# Patient Record
Sex: MALE | Race: WHITE | Employment: OTHER | URBAN - NONMETROPOLITAN AREA
[De-identification: names, ages, dates, MRNs, and addresses within clinical notes are randomized per-mention and may not be internally consistent; named-entity substitution may affect disease eponyms.]

---

## 2020-01-17 ENCOUNTER — HOSPITAL ENCOUNTER (OUTPATIENT)
Dept: MRI IMAGING | Age: 65
Discharge: HOME | End: 2020-01-19
Payer: COMMERCIAL

## 2020-01-17 PROCEDURE — 72148 MRI LUMBAR SPINE W/O DYE: CPT

## 2020-05-21 ENCOUNTER — HOSPITAL ENCOUNTER (OUTPATIENT)
Age: 65
Setting detail: SPECIMEN
Discharge: HOME | End: 2020-05-21

## 2020-05-21 ENCOUNTER — OFFICE VISIT (OUTPATIENT)
Dept: UROLOGY | Age: 65
End: 2020-05-21
Payer: COMMERCIAL

## 2020-05-21 VITALS — SYSTOLIC BLOOD PRESSURE: 142 MMHG | DIASTOLIC BLOOD PRESSURE: 92 MMHG | WEIGHT: 233 LBS | BODY MASS INDEX: 34.41 KG/M2

## 2020-05-21 LAB
-: ABNORMAL
AMORPHOUS: ABNORMAL
BACTERIA: ABNORMAL
BILIRUBIN URINE: NEGATIVE
CASTS UA: ABNORMAL /LPF
COLOR: YELLOW
COMMENT UA: ABNORMAL
CRYSTALS, UA: ABNORMAL /HPF
EPITHELIAL CELLS UA: ABNORMAL /HPF (ref 0–5)
GLUCOSE URINE: NEGATIVE
KETONES, URINE: NEGATIVE
LEUKOCYTE ESTERASE, URINE: NEGATIVE
MUCUS: ABNORMAL
NITRITE, URINE: NEGATIVE
OTHER OBSERVATIONS UA: ABNORMAL
PH UA: 5.5 (ref 5–9)
PROTEIN UA: NEGATIVE
RBC UA: ABNORMAL /HPF (ref 0–2)
RENAL EPITHELIAL, UA: ABNORMAL /HPF
SPECIFIC GRAVITY UA: >1.03 (ref 1.01–1.02)
TRICHOMONAS: ABNORMAL
TURBIDITY: CLEAR
URINE HGB: NEGATIVE
UROBILINOGEN, URINE: NORMAL
WBC UA: ABNORMAL /HPF (ref 0–5)
YEAST: ABNORMAL

## 2020-05-21 PROCEDURE — 81001 URINALYSIS AUTO W/SCOPE: CPT

## 2020-05-21 PROCEDURE — 99204 OFFICE O/P NEW MOD 45 MIN: CPT | Performed by: UROLOGY

## 2020-05-21 PROCEDURE — 87086 URINE CULTURE/COLONY COUNT: CPT

## 2020-05-21 RX ORDER — MELOXICAM 15 MG/1
15 TABLET ORAL DAILY PRN
COMMUNITY
Start: 2020-05-12

## 2020-05-21 RX ORDER — TRAMADOL HYDROCHLORIDE 50 MG/1
TABLET ORAL
COMMUNITY
Start: 2020-04-09

## 2020-05-21 ASSESSMENT — ENCOUNTER SYMPTOMS
EYE PAIN: 0
ABDOMINAL PAIN: 0
COLOR CHANGE: 0
SHORTNESS OF BREATH: 0
EYE REDNESS: 0
VOMITING: 0
BACK PAIN: 0
NAUSEA: 0
WHEEZING: 0
COUGH: 0

## 2020-05-21 NOTE — PROGRESS NOTES
HPI:    Patient is a 60 y.o. male in no acute distress. He is alert and oriented to person, place, and time. History  New patient being seen here with several complaints. 1) LUTS - most bothersome is nocturia, also sensation of incomplete emptying, urgency, weak stream. Has post void dribbling. These are new in the last year or so and are slowly worsening. Has not tried meds for this in past.      The following issues have all been for the last 4 years following an inguinal hernia repair. he had a lot of complications with the surgery and it was very invasive. Ended up with hematoma of entire flank and upper thigh.     2) ED - able to achieve erection but unable to maintain through climax. Tried prn Cialis in past, worked well but he had congestion so stopped it. However he then got tested for allergies and thinks it was more related to that than the medication so he is willing to try it again. 3) Peyronies - has noticed dorsal curvature. Not worsening. Does not cause pain to partner when they attempt intercourse. 4) Left scrotal/testicular pain - intermittent, sometimes lasts an entire day sometimes just a few hours. Not every day, a few times per week he thinks. Currently  Patient being seen here today for recurrent orchialgia. Patient has been seen here in the past.  Patient has not been seen in this office for approximately 4 years. Patient has been having pain in his left testicle and left inguinal region for approximately 9 years. Patient did have an extensive left inguinal hernia repair with a surgeon who lost his job immediately afterwards. Patient does state that he had ecchymosis from his nipples to his knees afterwards. He has not had any recent abdominal or inguinal imaging. He has had scrotal ultrasounds in the past that have been negative. None for the past couple of years. Patient has minimal lower urinary tract symptoms.   I do not have any record of any recent PSA testing or (ZOCOR) 20 MG tablet Take 20 mg by mouth nightly.  fluticasone (FLONASE) 50 MCG/ACT nasal spray 1 spray by Nasal route 3 times daily. As needed       No current facility-administered medications on file prior to visit. Codeine  History reviewed. No pertinent family history. Social History     Tobacco Use   Smoking Status Never Smoker   Smokeless Tobacco Never Used       Social History     Substance and Sexual Activity   Alcohol Use Yes    Alcohol/week: 0.0 standard drinks    Comment: rare       Review of Systems   Constitutional: Negative for appetite change, chills and fever. Eyes: Negative for pain, redness and visual disturbance. Respiratory: Negative for cough, shortness of breath and wheezing. Cardiovascular: Negative for chest pain and leg swelling. Gastrointestinal: Negative for abdominal pain, nausea and vomiting. Genitourinary: Positive for testicular pain. Negative for difficulty urinating, discharge, dysuria, flank pain, frequency, hematuria and scrotal swelling. Musculoskeletal: Negative for back pain, joint swelling and myalgias. Skin: Negative for color change, rash and wound. Neurological: Negative for dizziness, tremors and numbness. Hematological: Negative for adenopathy. Does not bruise/bleed easily. BP (!) 142/92 (Site: Left Upper Arm, Position: Sitting, Cuff Size: Large Adult)   Wt 233 lb (105.7 kg)   BMI 34.41 kg/m²       PHYSICAL EXAM:  Constitutional: Patient in no acute distress; Neuro: alert and oriented to person place and time. Psych: Mood and affect normal.  Skin: Normal  Lungs: Respiratory effort normal  Cardiovascular:  Normal peripheral pulses  Abdomen: Soft, non-tender, non-distended with no CVA, flank pain, hepatosplenomegaly or hernia. Kidneys normal.  Bladder non-tender and not distended.   Lymphatics: no palpable lymphadenopathy  Penis normal  Urethral meatus normal  Scrotal exam normal  Testicles normal bilaterally  Epididymis normal

## 2020-05-22 LAB
CULTURE: NO GROWTH
Lab: NORMAL
SPECIMEN DESCRIPTION: NORMAL

## 2020-05-26 ENCOUNTER — TELEPHONE (OUTPATIENT)
Dept: UROLOGY | Age: 65
End: 2020-05-26

## 2020-05-27 NOTE — TELEPHONE ENCOUNTER
If we are worried about a hernia then a CT is best. We can try without contrast, but he has to understand the risk or not using the contrast and not obtaining the information we need. US are great for scrotal and testicular issues, but often miss hernias. Why does he not want to use contrast? Would he like to come in and discuss his options.     We just dont want to do testing if we will end of missing something and still require more testing

## 2020-06-08 ENCOUNTER — TELEPHONE (OUTPATIENT)
Dept: UROLOGY | Age: 65
End: 2020-06-08

## 2020-06-08 NOTE — TELEPHONE ENCOUNTER
Advised patient of the last response. He said he had a good friend who had dye for a test on his heart and his kidneys shut down. Patient is adamant that he really does not want dye.

## 2020-06-15 ENCOUNTER — OFFICE VISIT (OUTPATIENT)
Dept: UROLOGY | Age: 65
End: 2020-06-15
Payer: COMMERCIAL

## 2020-06-15 VITALS — DIASTOLIC BLOOD PRESSURE: 80 MMHG | SYSTOLIC BLOOD PRESSURE: 110 MMHG | WEIGHT: 233 LBS | BODY MASS INDEX: 34.41 KG/M2

## 2020-06-15 PROCEDURE — 99213 OFFICE O/P EST LOW 20 MIN: CPT | Performed by: UROLOGY

## 2020-06-15 ASSESSMENT — ENCOUNTER SYMPTOMS
COUGH: 0
EYE REDNESS: 0
WHEEZING: 0
EYE PAIN: 0
COLOR CHANGE: 0
NAUSEA: 0
ABDOMINAL PAIN: 0
BACK PAIN: 0
SHORTNESS OF BREATH: 0
VOMITING: 0

## 2020-06-15 NOTE — PROGRESS NOTES
HPI:    Patient is a 60 y.o. male in no acute distress. He is alert and oriented to person, place, and time. History  New patient being seen here with several complaints. 1) LUTS - most bothersome is nocturia, also sensation of incomplete emptying, urgency, weak stream. Has post void dribbling. These are new in the last year or so and are slowly worsening. Has not tried meds for this in past.      The following issues have all been for the last 4 years following an inguinal hernia repair. he had a lot of complications with the surgery and it was very invasive. Ended up with hematoma of entire flank and upper thigh.     2) ED - able to achieve erection but unable to maintain through climax. Tried prn Cialis in past, worked well but he had congestion so stopped it. However he then got tested for allergies and thinks it was more related to that than the medication so he is willing to try it again. 3) Peyronies - has noticed dorsal curvature. Not worsening. Does not cause pain to partner when they attempt intercourse. 4) Left scrotal/testicular pain - intermittent, sometimes lasts an entire day sometimes just a few hours. Not every day, a few times per week he thinks.         Currently  Patient is here today for follow-up for left scrotal or inguinal pain. Patient is concerned about having a CT scan with dye performed. Patient does continue to have left inguinal pain. This does radiate into the scrotum. Patient does not want to get contrast initiation due to her friend having kidney failure after contrast.  Patient does wish for further investigation. Patient would like us to try to obtain his operative report from the previous surgery that he had. Patient has no pain today.   Past Medical History:   Diagnosis Date    Arthritis     BPH (benign prostatic hyperplasia)     Erectile dysfunction 1/28/2016    Herpes zoster without mention of complication     Hyperlipidemia     Hypertension      Past

## 2020-07-06 ENCOUNTER — OFFICE VISIT (OUTPATIENT)
Dept: UROLOGY | Age: 65
End: 2020-07-06
Payer: COMMERCIAL

## 2020-07-06 VITALS
HEIGHT: 68 IN | SYSTOLIC BLOOD PRESSURE: 130 MMHG | DIASTOLIC BLOOD PRESSURE: 88 MMHG | BODY MASS INDEX: 35.31 KG/M2 | WEIGHT: 233 LBS | TEMPERATURE: 97.5 F

## 2020-07-06 PROCEDURE — 99214 OFFICE O/P EST MOD 30 MIN: CPT | Performed by: UROLOGY

## 2020-07-06 ASSESSMENT — ENCOUNTER SYMPTOMS
WHEEZING: 0
VOMITING: 0
EYE REDNESS: 0
SHORTNESS OF BREATH: 0
BACK PAIN: 0
NAUSEA: 0
ABDOMINAL PAIN: 0
EYE PAIN: 0
COUGH: 0
COLOR CHANGE: 0

## 2020-07-06 NOTE — PATIENT INSTRUCTIONS
Went over Trimix injection technique with the patient during today's office visit. He was instructed as follows:  · wash hands   · clean rubber top of medication with alcohol wipe  · draw up medication (discussed dose titration from 0.1-0.5 mL as needed)  · clean injection location with alcohol wipe  · hold penis towards opposite leg  · inject into the base at the 10:00 or 2:00 position with needle at 90 degree angle avoiding any visible blood vessels  · switch sides each time. · if develops bruising/hematoma, avoid that side until healed. · hold pressure if has bleeding after injection. · go to ER for erection lasting >4 hrs. SURVEY:    You may be receiving a survey from [employer] regarding your visit today. Please complete the survey to enable us to provide the highest quality of care to you and your family. If you cannot score us a very good on any question, please call the office to discuss how we could have made your experience a very good one. Thank you.

## 2020-07-06 NOTE — PROGRESS NOTES
HPI:    Patient is a 60 y.o. male in no acute distress. He is alert and oriented to person, place, and time. New patient being seen here with several complaints. 1) LUTS - most bothersome is nocturia, also sensation of incomplete emptying, urgency, weak stream. Has post void dribbling. These are new in the last year or so and are slowly worsening. Has not tried meds for this in past.      The following issues have all been for the last 4 years following an inguinal hernia repair. he had a lot of complications with the surgery and it was very invasive. Ended up with hematoma of entire flank and upper thigh.     2) ED - able to achieve erection but unable to maintain through climax. Tried prn Cialis in past, worked well but he had congestion so stopped it. However he then got tested for allergies and thinks it was more related to that than the medication so he is willing to try it again. 3) Peyronies - has noticed dorsal curvature. Not worsening. Does not cause pain to partner when they attempt intercourse. 4) Left scrotal/testicular pain - intermittent, sometimes lasts an entire day sometimes just a few hours. Not every day, a few times per week he thinks. Currently:  Patient is here today for follow-up left testicular pain and erectile dysfunction. Patient did have a CT abdomen and pelvis performed at outside hospital.  Report was reviewed showing small right-sided fat-containing inguinal hernia. No other significant  abnormalities. Patient does continue to have intermittent left testicular discomfort. He denies any fever, chills, dysuria, hematuria, change in urinary symptoms. We also reviewed operative reports from 8/2010 redo left inguinal hernia repair. We did discuss this report with him. Patient also has erectile dysfunction. He states that he also has a curvature of his penis/Peyronie's as we had discussed in prior appointments. He would like to pursue erectile dysfunction treatment. He has used both Viagra and Cialis in the past with side effect of headache. Past Medical History:   Diagnosis Date    Arthritis     BPH (benign prostatic hyperplasia)     Erectile dysfunction 1/28/2016    Herpes zoster without mention of complication     Hyperlipidemia     Hypertension      Past Surgical History:   Procedure Laterality Date    ANKLE FRACTURE SURGERY      ANKLE SURGERY      BACK SURGERY      BACK SURGERY      CHOLECYSTECTOMY      HERNIA REPAIR      KNEE SURGERY Left      Outpatient Encounter Medications as of 7/6/2020   Medication Sig Dispense Refill    meloxicam (MOBIC) 15 MG tablet Take 15 mg by mouth daily       traMADol (ULTRAM) 50 MG tablet TAKE 1 TABLET BY MOUTH UP TO TWICE A DAY AS NEEDED **MUST LAST 30 DAYS**      CIALIS 5 MG tablet TAKE 1 TABLET BY MOUTH EVERY DAY 30 tablet 10    Omega-3 Fatty Acids (OMEGA 3 PO) Take by mouth daily       Multiple Vitamin (MULTI VITAMIN MENS PO) Take by mouth daily       aspirin 325 MG tablet Take 325 mg by mouth daily.  simvastatin (ZOCOR) 20 MG tablet Take 20 mg by mouth nightly.  fluticasone (FLONASE) 50 MCG/ACT nasal spray 1 spray by Nasal route 3 times daily. As needed       No facility-administered encounter medications on file as of 7/6/2020. Current Outpatient Medications on File Prior to Visit   Medication Sig Dispense Refill    meloxicam (MOBIC) 15 MG tablet Take 15 mg by mouth daily       traMADol (ULTRAM) 50 MG tablet TAKE 1 TABLET BY MOUTH UP TO TWICE A DAY AS NEEDED **MUST LAST 30 DAYS**      CIALIS 5 MG tablet TAKE 1 TABLET BY MOUTH EVERY DAY 30 tablet 10    Omega-3 Fatty Acids (OMEGA 3 PO) Take by mouth daily       Multiple Vitamin (MULTI VITAMIN MENS PO) Take by mouth daily       aspirin 325 MG tablet Take 325 mg by mouth daily.  simvastatin (ZOCOR) 20 MG tablet Take 20 mg by mouth nightly.  fluticasone (FLONASE) 50 MCG/ACT nasal spray 1 spray by Nasal route 3 times daily.  As needed No current facility-administered medications on file prior to visit. Codeine  Family History   Problem Relation Age of Onset    Heart Disease Father      Social History     Tobacco Use   Smoking Status Never Smoker   Smokeless Tobacco Never Used       Social History     Substance and Sexual Activity   Alcohol Use Yes    Alcohol/week: 0.0 standard drinks    Comment: rare       Review of Systems   Constitutional: Negative for appetite change, chills and fever. Eyes: Negative for pain, redness and visual disturbance. Respiratory: Negative for cough, shortness of breath and wheezing. Cardiovascular: Negative for chest pain and leg swelling. Gastrointestinal: Negative for abdominal pain, nausea and vomiting. Genitourinary: Positive for testicular pain. Negative for difficulty urinating, discharge, dysuria, flank pain, frequency, hematuria and scrotal swelling. Musculoskeletal: Negative for back pain, joint swelling and myalgias. Skin: Negative for color change, rash and wound. Neurological: Negative for dizziness, tremors and numbness. Hematological: Negative for adenopathy. Does not bruise/bleed easily. /88 (Site: Right Upper Arm, Position: Sitting, Cuff Size: Large Adult)   Temp 97.5 °F (36.4 °C) (Infrared)   Ht 5' 8\" (1.727 m)   Wt 233 lb (105.7 kg)   BMI 35.43 kg/m²        PHYSICAL EXAM:  Constitutional: Patient in no acute distress; Neuro: alert and oriented to person place and time. Psych: Mood and affect normal.  Skin: Normal  Lungs: Respiratory effort normal  Cardiovascular:  Normal peripheral pulses  Abdomen: Soft, non-tender, non-distended with no CVA, flank pain, hepatosplenomegaly or hernia. Kidneys normal.  Bladder non-tender and not distended.   Lymphatics: no palpable lymphadenopathy  Penis normal  Urethral meatus normal  Scrotal exam normal  Testicles mild tenderness left testicle  Epididymis normal bilaterally  Rectal: Deferred      Lab Results Component Value Date    BUN 10 10/07/2012     Lab Results   Component Value Date    CREATININE 0.89 10/07/2012     No results found for: PSA    ASSESSMENT:   Diagnosis Orders   1. Orchalgia     2. Benign non-nodular prostatic hyperplasia with lower urinary tract symptoms     3. Erectile dysfunction, unspecified erectile dysfunction type         PLAN:  We do not have any distinct cause for his left testicular pain based on recent CT findings as well as reviewing prior operative reports. His pain could be neurologic in nature and we did offer him trial of Neurontin. At this time. Patient defers. For his erectile dysfunction we went over Trimix injection technique with the patient during today's office visit. He was instructed as follows:  · wash hands   · clean rubber top of medication with alcohol wipe  · draw up medication (discussed dose titration from 0.1-0.5 mL as needed)  · clean injection location with alcohol wipe  · hold penis towards opposite leg  · inject into the base at the 10:00 or 2:00 position with needle at 90 degree angle avoiding any visible blood vessels  · switch sides each time. · if develops bruising/hematoma, avoid that side until healed. · hold pressure if has bleeding after injection. · go to ER for erection lasting >4 hrs. Pt will attempt this as instructed. He will call office if he has any difficulty or questions. We will follow up in 2 months to assess efficacy.

## 2020-09-21 ENCOUNTER — OFFICE VISIT (OUTPATIENT)
Dept: UROLOGY | Age: 65
End: 2020-09-21
Payer: COMMERCIAL

## 2020-09-21 VITALS
SYSTOLIC BLOOD PRESSURE: 118 MMHG | WEIGHT: 218 LBS | TEMPERATURE: 97.1 F | BODY MASS INDEX: 33.15 KG/M2 | DIASTOLIC BLOOD PRESSURE: 82 MMHG

## 2020-09-21 PROCEDURE — 99213 OFFICE O/P EST LOW 20 MIN: CPT | Performed by: UROLOGY

## 2020-09-21 SDOH — HEALTH STABILITY: MENTAL HEALTH: HOW OFTEN DO YOU HAVE A DRINK CONTAINING ALCOHOL?: NEVER

## 2020-09-21 ASSESSMENT — ENCOUNTER SYMPTOMS
EYE PAIN: 0
ABDOMINAL PAIN: 0
EYE REDNESS: 0
BACK PAIN: 0
COLOR CHANGE: 0
WHEEZING: 0
VOMITING: 0
NAUSEA: 0
COUGH: 0
SHORTNESS OF BREATH: 0

## 2020-09-21 NOTE — PROGRESS NOTES
HPI:    Patient is a 60 y.o. male in no acute distress. He is alert and oriented to person, place, and time. New patient being seen here with several complaints. 1) LUTS - most bothersome is nocturia, also sensation of incomplete emptying, urgency, weak stream. Has post void dribbling. These are new in the last year or so and are slowly worsening. Has not tried meds for this in past.      The following issues have all been for the last 4 years following an inguinal hernia repair. he had a lot of complications with the surgery and it was very invasive. Ended up with hematoma of entire flank and upper thigh.     2) ED - able to achieve erection but unable to maintain through climax. Tried prn Cialis in past, worked well but he had congestion so stopped it. However he then got tested for allergies and thinks it was more related to that than the medication so he is willing to try it again. 3) Peyronies - has noticed dorsal curvature. Not worsening. Does not cause pain to partner when they attempt intercourse. 4) Left scrotal/testicular pain - intermittent, sometimes lasts an entire day sometimes just a few hours. Not every day, a few times per week he thinks.      Currently:  Patient is here today for 2-month follow-up. We are seeing him for left testicular pain. We did give him Trimix injection for erections. We did offer him with Neurontin for his testicular pain. He did not want to take this. He does state that approximately 0.3 mL of Trimix works very well for him. He is not worried about his testicular pain at this point time. We did expect him to get PSA today. He did get lab work through primary care but this was unavailable to us today. We will work on obtaining this. No pain today.     Past Medical History:   Diagnosis Date    Arthritis     BPH (benign prostatic hyperplasia)     Erectile dysfunction 1/28/2016    Herpes zoster without mention of complication     Hyperlipidemia     Hypertension      Past Surgical History:   Procedure Laterality Date    ANKLE FRACTURE SURGERY      ANKLE SURGERY      BACK SURGERY      BACK SURGERY      CHOLECYSTECTOMY      HERNIA REPAIR      KNEE SURGERY Left      Outpatient Encounter Medications as of 9/21/2020   Medication Sig Dispense Refill    meloxicam (MOBIC) 15 MG tablet Take 15 mg by mouth daily as needed       traMADol (ULTRAM) 50 MG tablet TAKE 1 TABLET BY MOUTH UP TO TWICE A DAY AS NEEDED **MUST LAST 30 DAYS**      Omega-3 Fatty Acids (OMEGA 3 PO) Take by mouth daily       Multiple Vitamin (MULTI VITAMIN MENS PO) Take by mouth daily       aspirin 325 MG tablet Take 325 mg by mouth daily.  simvastatin (ZOCOR) 20 MG tablet Take 20 mg by mouth nightly.  fluticasone (FLONASE) 50 MCG/ACT nasal spray 1 spray by Nasal route 3 times daily. As needed      CIALIS 5 MG tablet TAKE 1 TABLET BY MOUTH EVERY DAY (Patient not taking: Reported on 9/21/2020) 30 tablet 10     No facility-administered encounter medications on file as of 9/21/2020. Current Outpatient Medications on File Prior to Visit   Medication Sig Dispense Refill    meloxicam (MOBIC) 15 MG tablet Take 15 mg by mouth daily as needed       traMADol (ULTRAM) 50 MG tablet TAKE 1 TABLET BY MOUTH UP TO TWICE A DAY AS NEEDED **MUST LAST 30 DAYS**      Omega-3 Fatty Acids (OMEGA 3 PO) Take by mouth daily       Multiple Vitamin (MULTI VITAMIN MENS PO) Take by mouth daily       aspirin 325 MG tablet Take 325 mg by mouth daily.  simvastatin (ZOCOR) 20 MG tablet Take 20 mg by mouth nightly.  fluticasone (FLONASE) 50 MCG/ACT nasal spray 1 spray by Nasal route 3 times daily. As needed      CIALIS 5 MG tablet TAKE 1 TABLET BY MOUTH EVERY DAY (Patient not taking: Reported on 9/21/2020) 30 tablet 10     No current facility-administered medications on file prior to visit.       Codeine  Family History   Problem Relation Age of Onset    Heart Disease Father      Social History     Tobacco Use   Smoking Status Never Smoker   Smokeless Tobacco Never Used       Social History     Substance and Sexual Activity   Alcohol Use Never    Alcohol/week: 0.0 standard drinks    Frequency: Never    Comment: rare       Review of Systems   Constitutional: Negative for appetite change, chills and fever. Eyes: Negative for pain, redness and visual disturbance. Respiratory: Negative for cough, shortness of breath and wheezing. Cardiovascular: Negative for chest pain and leg swelling. Gastrointestinal: Negative for abdominal pain, nausea and vomiting. Genitourinary: Negative for difficulty urinating, discharge, dysuria, flank pain, frequency, hematuria, scrotal swelling and testicular pain. Musculoskeletal: Negative for back pain, joint swelling and myalgias. Skin: Negative for color change, rash and wound. Neurological: Negative for dizziness, tremors and numbness. Hematological: Negative for adenopathy. Does not bruise/bleed easily. /82 (Site: Right Upper Arm, Position: Sitting, Cuff Size: Large Adult)   Temp 97.1 °F (36.2 °C)   Wt 218 lb (98.9 kg)   BMI 33.15 kg/m²       PHYSICAL EXAM:  Constitutional: Patient in no acute distress; Neuro: alert and oriented to person place and time. Psych: Mood and affect normal.  Skin: Normal  Lungs: Respiratory effort normal  Cardiovascular:  Normal peripheral pulses  Abdomen: Soft, non-tender, non-distended with no CVA, flank pain, hepatosplenomegaly or hernia. Kidneys normal.  Bladder non-tender and not distended.   Lymphatics: no palpable lymphadenopathy  Penis normal  Urethral meatus normal  Scrotal exam normal  Testicles normal bilaterally  Epididymis normal bilaterally  No evidence of inguinal hernia      Lab Results   Component Value Date    BUN 10 10/07/2012     Lab Results   Component Value Date    CREATININE 0.89 10/07/2012     No results found for: PSA    ASSESSMENT:  This is a 60 y.o. male with the following

## 2020-12-21 ENCOUNTER — TELEPHONE (OUTPATIENT)
Dept: UROLOGY | Age: 65
End: 2020-12-21

## 2020-12-21 ENCOUNTER — OFFICE VISIT (OUTPATIENT)
Dept: UROLOGY | Age: 65
End: 2020-12-21
Payer: COMMERCIAL

## 2020-12-21 VITALS
BODY MASS INDEX: 32.89 KG/M2 | DIASTOLIC BLOOD PRESSURE: 84 MMHG | HEART RATE: 80 BPM | SYSTOLIC BLOOD PRESSURE: 131 MMHG | WEIGHT: 217 LBS | TEMPERATURE: 97.1 F | HEIGHT: 68 IN

## 2020-12-21 PROCEDURE — 99213 OFFICE O/P EST LOW 20 MIN: CPT | Performed by: PHYSICIAN ASSISTANT

## 2020-12-21 ASSESSMENT — ENCOUNTER SYMPTOMS
WHEEZING: 0
NAUSEA: 0
EYE REDNESS: 0
COLOR CHANGE: 0
SHORTNESS OF BREATH: 0
ABDOMINAL PAIN: 0
CONSTIPATION: 0
VOMITING: 0
BACK PAIN: 0
COUGH: 0

## 2020-12-21 NOTE — PROGRESS NOTES
HPI:      Patient is a 60 y.o. male in no acute distress. He is alert and oriented to person, place, and time. History:   New patient being seen here with several complaints. 1) LUTS - most bothersome is nocturia, also sensation of incomplete emptying, urgency, weak stream. Has post void dribbling. These are new in the last year or so and are slowly worsening. Has not tried meds for this in past.      The following issues have all been for the last 4 years following an inguinal hernia repair. he had a lot of complications with the surgery and it was very invasive. Ended up with hematoma of entire flank and upper thigh.     2) ED - able to achieve erection but unable to maintain through climax. Tried prn Cialis in past, worked well but he had congestion so stopped it. However he then got tested for allergies and thinks it was more related to that than the medication so he is willing to try it again. 3) Peyronies - has noticed dorsal curvature. Not worsening. Does not cause pain to partner when they attempt intercourse. 4) Left scrotal/testicular pain - intermittent, sometimes lasts an entire day sometimes just a few hours.  Not every day, a few times per week he thinks.      Today: Patient is here today for follow-up erectile dysfunction. He does state that approximately 0.3 mL of Trimix works very well for him, he states that he rarely uses this medication due to cost.  Patient is questioning whether or not he needs screening for prostate cancer. He states that he feels his PCP has been checking his PSA. We did review recent lab work from his PCP which did not show a PSA. We also reached out to PCPs office and they do not have any PSAs on file. Patient does not wish to have any testing done until he is on Medicare which is in February 2021. He does state that he lost approximately 15 pounds over the course of 4 months but he relates this to stopping drinking dark sodas. He states that he used to drink copious amounts of sodas and has not been drinking this and therefore feels he has lost weight due to this. He denies any loss of appetite. He denies any new hip back pelvic or other bony pain. He denies any new or worsening lower extremity paresthesias or neuropathy. Patient also states that he has continued left scrotal and testicular pain. He states that this has never been completely resolved. He did have a full work-up for this previously. He admits to wearing loose fitting underwear. In the past we had offered Neurontin for this pain and he has declined. Patient does have nocturia x2-3. He also has post void dribbling. He does have a daily bowel movement.   He states that he drinks coffee until 2 PM.      Past Medical History:   Diagnosis Date    Arthritis     BPH (benign prostatic hyperplasia)     Erectile dysfunction 1/28/2016    Herpes zoster without mention of complication     Hyperlipidemia     Hypertension      Past Surgical History:   Procedure Laterality Date    ANKLE FRACTURE SURGERY      ANKLE SURGERY      BACK SURGERY      BACK SURGERY      CHOLECYSTECTOMY      HERNIA REPAIR      KNEE SURGERY Left Outpatient Encounter Medications as of 12/21/2020   Medication Sig Dispense Refill    meloxicam (MOBIC) 15 MG tablet Take 15 mg by mouth daily as needed       traMADol (ULTRAM) 50 MG tablet TAKE 1 TABLET BY MOUTH UP TO TWICE A DAY AS NEEDED **MUST LAST 30 DAYS**      Omega-3 Fatty Acids (OMEGA 3 PO) Take by mouth daily       Multiple Vitamin (MULTI VITAMIN MENS PO) Take by mouth daily       simvastatin (ZOCOR) 20 MG tablet Take 20 mg by mouth nightly.  CIALIS 5 MG tablet TAKE 1 TABLET BY MOUTH EVERY DAY (Patient not taking: Reported on 9/21/2020) 30 tablet 10    aspirin 325 MG tablet Take 325 mg by mouth daily.  fluticasone (FLONASE) 50 MCG/ACT nasal spray 1 spray by Nasal route 3 times daily. As needed       No facility-administered encounter medications on file as of 12/21/2020. Current Outpatient Medications on File Prior to Visit   Medication Sig Dispense Refill    meloxicam (MOBIC) 15 MG tablet Take 15 mg by mouth daily as needed       traMADol (ULTRAM) 50 MG tablet TAKE 1 TABLET BY MOUTH UP TO TWICE A DAY AS NEEDED **MUST LAST 30 DAYS**      Omega-3 Fatty Acids (OMEGA 3 PO) Take by mouth daily       Multiple Vitamin (MULTI VITAMIN MENS PO) Take by mouth daily       simvastatin (ZOCOR) 20 MG tablet Take 20 mg by mouth nightly.  CIALIS 5 MG tablet TAKE 1 TABLET BY MOUTH EVERY DAY (Patient not taking: Reported on 9/21/2020) 30 tablet 10    aspirin 325 MG tablet Take 325 mg by mouth daily.  fluticasone (FLONASE) 50 MCG/ACT nasal spray 1 spray by Nasal route 3 times daily. As needed       No current facility-administered medications on file prior to visit.       Codeine  Family History   Problem Relation Age of Onset    Heart Disease Father      Social History     Tobacco Use   Smoking Status Never Smoker   Smokeless Tobacco Never Used       Social History     Substance and Sexual Activity   Alcohol Use Never    Alcohol/week: 0.0 standard drinks    Frequency: Never Comment: rare       Review of Systems   Constitutional: Negative for appetite change, chills and fever. Eyes: Negative for redness and visual disturbance. Respiratory: Negative for cough, shortness of breath and wheezing. Cardiovascular: Negative for chest pain and leg swelling. Gastrointestinal: Negative for abdominal pain, constipation, nausea and vomiting. Genitourinary: Negative for decreased urine volume, difficulty urinating, discharge, dysuria, enuresis, flank pain, frequency, hematuria, penile pain, scrotal swelling, testicular pain and urgency. Positive for post void dribbling, positive for nocturia. Musculoskeletal: Negative for back pain, joint swelling and myalgias. Skin: Negative for color change, rash and wound. Neurological: Negative for dizziness, tremors and numbness. Hematological: Negative for adenopathy. Does not bruise/bleed easily. /84 (Site: Right Upper Arm, Position: Sitting, Cuff Size: Large Adult)   Pulse 80   Temp 97.1 °F (36.2 °C) (Temporal)   Ht 5' 8\" (1.727 m)   Wt 217 lb (98.4 kg)   BMI 32.99 kg/m²       PHYSICAL EXAM:  Constitutional: Patient in no acute distress; Neuro: alert and oriented to person place and time. Psych: Mood and affect normal.  Lungs: Respiratory effort normal  Abdomen: Soft, non-tender, non-distended with no CVA, flank pain  Rectal: Deferred      Lab Results   Component Value Date    BUN 10 10/07/2012     Lab Results   Component Value Date    CREATININE 0.89 10/07/2012     No results found for: PSA    ASSESSMENT:   Diagnosis Orders   1. Benign non-nodular prostatic hyperplasia with lower urinary tract symptoms     2. Erectile dysfunction, unspecified erectile dysfunction type     3. Screening PSA (prostate specific antigen)  Psa screening   4.  Pain in left testicle           PLAN:  Continue Trimix as needed We offered to check his PSA and do a JORDEN today in the office. Patient wishes to wait until he is on Medicare prior to doing any testing. We also offered him Flomax for his BPH symptoms. At this time he does not want to start any new medications again, until he is Medicare. We offered Neurontin for his groin pain, he declines    Recommended tight fitting underwear or compression shorts    Discussed bladder irritants thoroughly. Patient instructed to avoid/minimize intake of food/drinks such as: coffee, tea, caffeine, alcohol, carbonated beverages, dark soda/pop, spicy/acidic foods. Was sent home with a extensive list, including non-irritating alternatives. Follow-up in 2 months with PSA, JORDEN, PVR, we will start Flomax at that time as well.

## 2020-12-21 NOTE — PATIENT INSTRUCTIONS
BLADDER IRRITANTS     There are several changes you can make to your diet to help improve your urinary symptoms. The following have been shown to cause irritation to the bladder and should be AVOIDED if possible:  ~ Coffee (including decaffeinated)   ~ ALL Tea (including green teas and decaffeinated)  ~ Soda/Pop/carbonated beverages/Energy drinks (especially dark dyed margarette, root beers, mountain dew, etc)  ~These are MUCH worse if they have caffeine, but can also be irritative to the bladder even without caffeine  ~ Alcoholic beverages  ~ Spicy foods (peppers contain capsaicin, which is very irritating to the bladder)  ~ Acidic foods (for example: tomato based foods, orange juice, etc)    We encourage increased water intake, unless you have been placed on a fluid restriction by another provider. SURVEY:    You may be receiving a survey from [employer] regarding your visit today. Please complete the survey to enable us to provide the highest quality of care to you and your family. If you cannot score us a very good on any question, please call the office to discuss how we could of made your experience a very good one. Thank you.

## 2020-12-21 NOTE — TELEPHONE ENCOUNTER
Please call patient and let him know that Dr. Aaron Bernabe office did not have any PSAs on file. I did place an order for him to have a PSA drawn prior to his visit in February. Please be sure that he does this at least a week beforehand.

## 2021-02-18 ENCOUNTER — HOSPITAL ENCOUNTER (OUTPATIENT)
Age: 66
Discharge: HOME | End: 2021-02-18
Payer: MEDICARE

## 2021-02-18 DIAGNOSIS — N50.819 ORCHALGIA: ICD-10-CM

## 2021-02-18 DIAGNOSIS — C61 PROSTATE CANCER (HCC): ICD-10-CM

## 2021-02-18 DIAGNOSIS — R10.32 LEFT INGUINAL PAIN: ICD-10-CM

## 2021-02-18 LAB
BUN BLDV-MCNC: 16 MG/DL (ref 8–23)
CREAT SERPL-MCNC: 0.84 MG/DL (ref 0.7–1.2)
GFR AFRICAN AMERICAN: >60 ML/MIN
GFR NON-AFRICAN AMERICAN: >60 ML/MIN
GFR SERPL CREATININE-BSD FRML MDRD: NORMAL ML/MIN/{1.73_M2}
GFR SERPL CREATININE-BSD FRML MDRD: NORMAL ML/MIN/{1.73_M2}
PROSTATE SPECIFIC ANTIGEN: 1.6 UG/L

## 2021-02-18 PROCEDURE — 84153 ASSAY OF PSA TOTAL: CPT

## 2021-02-18 PROCEDURE — 82565 ASSAY OF CREATININE: CPT

## 2021-02-18 PROCEDURE — 36415 COLL VENOUS BLD VENIPUNCTURE: CPT

## 2021-02-18 PROCEDURE — 84520 ASSAY OF UREA NITROGEN: CPT

## 2021-02-22 ENCOUNTER — OFFICE VISIT (OUTPATIENT)
Dept: UROLOGY | Age: 66
End: 2021-02-22
Payer: MEDICARE

## 2021-02-22 VITALS
HEART RATE: 91 BPM | DIASTOLIC BLOOD PRESSURE: 79 MMHG | BODY MASS INDEX: 34.36 KG/M2 | WEIGHT: 226 LBS | SYSTOLIC BLOOD PRESSURE: 123 MMHG

## 2021-02-22 DIAGNOSIS — N40.1 BENIGN NON-NODULAR PROSTATIC HYPERPLASIA WITH LOWER URINARY TRACT SYMPTOMS: Primary | ICD-10-CM

## 2021-02-22 DIAGNOSIS — Z12.5 SCREENING PSA (PROSTATE SPECIFIC ANTIGEN): ICD-10-CM

## 2021-02-22 DIAGNOSIS — N52.9 ERECTILE DYSFUNCTION, UNSPECIFIED ERECTILE DYSFUNCTION TYPE: ICD-10-CM

## 2021-02-22 PROCEDURE — 4040F PNEUMOC VAC/ADMIN/RCVD: CPT | Performed by: PHYSICIAN ASSISTANT

## 2021-02-22 PROCEDURE — G8417 CALC BMI ABV UP PARAM F/U: HCPCS | Performed by: PHYSICIAN ASSISTANT

## 2021-02-22 PROCEDURE — 1123F ACP DISCUSS/DSCN MKR DOCD: CPT | Performed by: PHYSICIAN ASSISTANT

## 2021-02-22 PROCEDURE — 99213 OFFICE O/P EST LOW 20 MIN: CPT | Performed by: PHYSICIAN ASSISTANT

## 2021-02-22 PROCEDURE — 3017F COLORECTAL CA SCREEN DOC REV: CPT | Performed by: PHYSICIAN ASSISTANT

## 2021-02-22 PROCEDURE — G8427 DOCREV CUR MEDS BY ELIG CLIN: HCPCS | Performed by: PHYSICIAN ASSISTANT

## 2021-02-22 PROCEDURE — 51798 US URINE CAPACITY MEASURE: CPT | Performed by: PHYSICIAN ASSISTANT

## 2021-02-22 PROCEDURE — 1036F TOBACCO NON-USER: CPT | Performed by: PHYSICIAN ASSISTANT

## 2021-02-22 PROCEDURE — G8484 FLU IMMUNIZE NO ADMIN: HCPCS | Performed by: PHYSICIAN ASSISTANT

## 2021-02-22 NOTE — PROGRESS NOTES
HPI:      Patient is a 73 y.o. male in no acute distress. He is alert and oriented to person, place, and time. New patient being seen here with several complaints. 1) LUTS - most bothersome is nocturia, also sensation of incomplete emptying, urgency, weak stream. Has post void dribbling. These are new in the last year or so and are slowly worsening. Has not tried meds for this in past.      The following issues have all been for the last 4 years following an inguinal hernia repair. he had a lot of complications with the surgery and it was very invasive. Ended up with hematoma of entire flank and upper thigh.     2) ED - able to achieve erection but unable to maintain through climax. Tried prn Cialis in past, worked well but he had congestion so stopped it. However he then got tested for allergies and thinks it was more related to that than the medication so he is willing to try it again. 3) Peyronies - has noticed dorsal curvature. Not worsening. Does not cause pain to partner when they attempt intercourse. 4) Left scrotal/testicular pain - intermittent, sometimes lasts an entire day sometimes just a few hours. Not every day, a few times per week he thinks.     PSA:  2/2021 - 1.60     Today:   Patient is here today for follow-up erectile dysfunction, PSA screening. Patient states that Trimix is efficacious for him but if insurance does not cover it at this point he does not want to continue the medication. He denies any chest pain, shortness of breath, prolonged erections. Patient states that since he has cut out dark sodas his urinary symptoms have greatly improved. Patient does have a bowel movement every day. Patient has nocturia x1. He no longer has any post void dribbling. He denies any fever, chills, gross hematuria, flank pain. Patient last voided 30 mins ago, scan = 16 mL. Patient did have a PSA prior to visit today which was 1.6.       Past Medical History:   Diagnosis Date  Arthritis     BPH (benign prostatic hyperplasia)     Erectile dysfunction 1/28/2016    Herpes zoster without mention of complication     Hyperlipidemia     Hypertension      Past Surgical History:   Procedure Laterality Date    ANKLE FRACTURE SURGERY      ANKLE SURGERY      BACK SURGERY      BACK SURGERY      CHOLECYSTECTOMY      HERNIA REPAIR      KNEE SURGERY Left      Outpatient Encounter Medications as of 2/22/2021   Medication Sig Dispense Refill    meloxicam (MOBIC) 15 MG tablet Take 15 mg by mouth daily as needed       traMADol (ULTRAM) 50 MG tablet TAKE 1 TABLET BY MOUTH UP TO TWICE A DAY AS NEEDED **MUST LAST 30 DAYS**      [DISCONTINUED] CIALIS 5 MG tablet TAKE 1 TABLET BY MOUTH EVERY DAY (Patient not taking: Reported on 9/21/2020) 30 tablet 10    Omega-3 Fatty Acids (OMEGA 3 PO) Take by mouth daily       Multiple Vitamin (MULTI VITAMIN MENS PO) Take by mouth daily       aspirin 325 MG tablet Take 325 mg by mouth daily.  simvastatin (ZOCOR) 20 MG tablet Take 20 mg by mouth nightly.  fluticasone (FLONASE) 50 MCG/ACT nasal spray 1 spray by Nasal route 3 times daily. As needed       No facility-administered encounter medications on file as of 2/22/2021. Current Outpatient Medications on File Prior to Visit   Medication Sig Dispense Refill    meloxicam (MOBIC) 15 MG tablet Take 15 mg by mouth daily as needed       traMADol (ULTRAM) 50 MG tablet TAKE 1 TABLET BY MOUTH UP TO TWICE A DAY AS NEEDED **MUST LAST 30 DAYS**      Omega-3 Fatty Acids (OMEGA 3 PO) Take by mouth daily       Multiple Vitamin (MULTI VITAMIN MENS PO) Take by mouth daily       aspirin 325 MG tablet Take 325 mg by mouth daily.  simvastatin (ZOCOR) 20 MG tablet Take 20 mg by mouth nightly.  fluticasone (FLONASE) 50 MCG/ACT nasal spray 1 spray by Nasal route 3 times daily. As needed       No current facility-administered medications on file prior to visit.       Codeine  Family History Problem Relation Age of Onset    Heart Disease Father      Social History     Tobacco Use   Smoking Status Never Smoker   Smokeless Tobacco Never Used       Social History     Substance and Sexual Activity   Alcohol Use Never    Alcohol/week: 0.0 standard drinks    Frequency: Never    Comment: rare       Review of Systems    /79 (Site: Left Upper Arm, Position: Sitting, Cuff Size: Large Adult)   Pulse 91   Wt 226 lb (102.5 kg)   BMI 34.36 kg/m²       PHYSICAL EXAM:  Constitutional: Patient in no acute distress; Neuro: alert and oriented to person place and time. Psych: Mood and affect normal.  Lungs: Respiratory effort normal  Abdomen: Soft, non-tender, non-distended  Anus and perineum normal  Normal rectal tone with no masses  Prostate soft, non-tender to palpation. No palpable nodules. Estimated 40 grams. Seminal vesicles not palpable. Lab Results   Component Value Date    BUN 16 02/18/2021     Lab Results   Component Value Date    CREATININE 0.84 02/18/2021     Lab Results   Component Value Date    PSA 1.60 02/18/2021       ASSESSMENT:   Diagnosis Orders   1. Benign non-nodular prostatic hyperplasia with lower urinary tract symptoms  ID MEASUREMENT,POST-VOID RESIDUAL VOLUME BY US,NON-IMAGING   2. Screening PSA (prostate specific antigen)  PSA screening   3. Erectile dysfunction, unspecified erectile dysfunction type           PLAN:  At this point we did offer to refill his Trimix but due to cost he does not wish to refill this medication. PSA in 1 year    Did encourage him to drink at least 60 ounces of water a day. Continue to limit bladder irritants    Follow-up in 1 year with PSA and PVR.

## 2022-03-05 ENCOUNTER — HOSPITAL ENCOUNTER (EMERGENCY)
Age: 67
Discharge: HOME | End: 2022-03-05
Attending: EMERGENCY MEDICINE
Payer: MEDICARE

## 2022-03-05 VITALS
OXYGEN SATURATION: 95 % | TEMPERATURE: 99.1 F | HEART RATE: 112 BPM | SYSTOLIC BLOOD PRESSURE: 120 MMHG | RESPIRATION RATE: 16 BRPM | DIASTOLIC BLOOD PRESSURE: 50 MMHG

## 2022-03-05 DIAGNOSIS — R11.2 NAUSEA VOMITING AND DIARRHEA: Primary | ICD-10-CM

## 2022-03-05 DIAGNOSIS — R19.7 NAUSEA VOMITING AND DIARRHEA: Primary | ICD-10-CM

## 2022-03-05 LAB
ANION GAP SERPL CALCULATED.3IONS-SCNC: 12 MMOL/L (ref 9–17)
BUN BLDV-MCNC: 20 MG/DL (ref 8–23)
BUN/CREAT BLD: 22 (ref 9–20)
CALCIUM SERPL-MCNC: 9.4 MG/DL (ref 8.6–10.4)
CHLORIDE BLD-SCNC: 99 MMOL/L (ref 98–107)
CO2: 26 MMOL/L (ref 20–31)
CREAT SERPL-MCNC: 0.9 MG/DL (ref 0.7–1.2)
GFR AFRICAN AMERICAN: >60 ML/MIN
GFR NON-AFRICAN AMERICAN: >60 ML/MIN
GFR SERPL CREATININE-BSD FRML MDRD: ABNORMAL ML/MIN/{1.73_M2}
GFR SERPL CREATININE-BSD FRML MDRD: ABNORMAL ML/MIN/{1.73_M2}
GLUCOSE BLD-MCNC: 213 MG/DL (ref 70–99)
POTASSIUM SERPL-SCNC: 4.1 MMOL/L (ref 3.7–5.3)
SODIUM BLD-SCNC: 137 MMOL/L (ref 135–144)

## 2022-03-05 PROCEDURE — 80048 BASIC METABOLIC PNL TOTAL CA: CPT

## 2022-03-05 PROCEDURE — 99284 EMERGENCY DEPT VISIT MOD MDM: CPT

## 2022-03-05 PROCEDURE — 6360000002 HC RX W HCPCS: Performed by: EMERGENCY MEDICINE

## 2022-03-05 PROCEDURE — 36415 COLL VENOUS BLD VENIPUNCTURE: CPT

## 2022-03-05 PROCEDURE — 2580000003 HC RX 258: Performed by: EMERGENCY MEDICINE

## 2022-03-05 PROCEDURE — 96375 TX/PRO/DX INJ NEW DRUG ADDON: CPT

## 2022-03-05 PROCEDURE — 96374 THER/PROPH/DIAG INJ IV PUSH: CPT

## 2022-03-05 RX ORDER — KETOROLAC TROMETHAMINE 15 MG/ML
15 INJECTION, SOLUTION INTRAMUSCULAR; INTRAVENOUS ONCE
Status: COMPLETED | OUTPATIENT
Start: 2022-03-05 | End: 2022-03-05

## 2022-03-05 RX ORDER — ONDANSETRON 2 MG/ML
4 INJECTION INTRAMUSCULAR; INTRAVENOUS ONCE
Status: COMPLETED | OUTPATIENT
Start: 2022-03-05 | End: 2022-03-05

## 2022-03-05 RX ORDER — ONDANSETRON 4 MG/1
4 TABLET, ORALLY DISINTEGRATING ORAL EVERY 8 HOURS PRN
Qty: 7 TABLET | Refills: 0 | Status: SHIPPED | OUTPATIENT
Start: 2022-03-05

## 2022-03-05 RX ORDER — SODIUM CHLORIDE, SODIUM LACTATE, POTASSIUM CHLORIDE, CALCIUM CHLORIDE 600; 310; 30; 20 MG/100ML; MG/100ML; MG/100ML; MG/100ML
2000 INJECTION, SOLUTION INTRAVENOUS ONCE
Status: COMPLETED | OUTPATIENT
Start: 2022-03-05 | End: 2022-03-05

## 2022-03-05 RX ADMIN — SODIUM CHLORIDE, POTASSIUM CHLORIDE, SODIUM LACTATE AND CALCIUM CHLORIDE 2000 ML: 600; 310; 30; 20 INJECTION, SOLUTION INTRAVENOUS at 05:27

## 2022-03-05 RX ADMIN — KETOROLAC TROMETHAMINE 15 MG: 15 INJECTION, SOLUTION INTRAMUSCULAR; INTRAVENOUS at 06:05

## 2022-03-05 RX ADMIN — ONDANSETRON 4 MG: 2 INJECTION INTRAMUSCULAR; INTRAVENOUS at 05:26

## 2022-03-05 ASSESSMENT — PAIN SCALES - GENERAL
PAINLEVEL_OUTOF10: 6
PAINLEVEL_OUTOF10: 0

## 2022-03-05 NOTE — ED NOTES
Pt has fluids infusing and a warm blanket was provided per request.      Jesse Cisse RN  03/05/22 [id number]

## 2022-03-05 NOTE — ED PROVIDER NOTES
[facility] ED  EMERGENCY DEPARTMENT ENCOUNTER      Pt Name: Rodríguez Crouch  MRN: [id number]  [place] 1955  Date of evaluation: 3/5/2022  Provider: Lori Garcia MD    [address] [place] [address]       Chief Complaint   Patient presents with    Emesis     Since 11pm food poisoning    Diarrhea         HISTORY OF PRESENT ILLNESS   (Location/Symptom, Timing/Onset, Context/Setting, Quality, Duration, Modifying Factors, Severity)  Note limiting factors. Rodríguez Crouch is a 78 y.o. male who presents to the emergency department concern for fever. States he was the only one who had blue cheese earlier this evening and subsequently had 420 episodes of diarrhea and 20 episodes of emesis. [place] any chest pain fevers or chills. Denies any other acute complaints. HPI    Nursing Notes were reviewed. REVIEW OF SYSTEMS    (2-9 systems for level 4, 10 or more for level 5)     Review of Systems   All other systems reviewed and are negative. Except as noted above the remainder of the review of systems was reviewed and negative. PAST MEDICAL HISTORY     Past Medical History:   Diagnosis Date    Arthritis     BPH (benign prostatic hyperplasia)     Erectile dysfunction 1/28/2016    Herpes zoster without mention of complication     Hyperlipidemia     Hypertension          SURGICAL HISTORY       Past Surgical History:   Procedure Laterality Date    ANKLE FRACTURE SURGERY      ANKLE SURGERY      BACK SURGERY      BACK SURGERY      CHOLECYSTECTOMY      HERNIA REPAIR      KNEE SURGERY Left          CURRENT MEDICATIONS       Previous Medications    ASPIRIN 325 MG TABLET    Take 325 mg by mouth daily. FLUTICASONE (FLONASE) 50 MCG/ACT NASAL SPRAY    1 spray by Nasal route 3 times daily.  As needed    MELOXICAM (MOBIC) 15 MG TABLET    Take 15 mg by mouth daily as needed     MULTIPLE VITAMIN (MULTI VITAMIN MENS PO)    Take by mouth daily     OMEGA-3 FATTY ACIDS (OMEGA 3 PO)    Take by mouth daily SIMVASTATIN (ZOCOR) 20 MG TABLET    Take 20 mg by mouth nightly. TRAMADOL (ULTRAM) 50 MG TABLET    TAKE 1 TABLET BY MOUTH UP TO TWICE A DAY AS NEEDED **MUST LAST 30 DAYS**       ALLERGIES     Codeine    FAMILY HISTORY       Family History   Problem Relation Age of Onset    Heart Disease Father           SOCIAL HISTORY       Social History     Socioeconomic History    Marital status:      Spouse name: None    Number of children: None    Years of education: None    Highest education level: None   Occupational History    None   Tobacco Use    Smoking status: Never Smoker    Smokeless tobacco: Never Used   Vaping Use    Vaping Use: Never used   Substance and Sexual Activity    Alcohol use: Never     Alcohol/week: 0.0 standard drinks     Comment: rare    Drug use: None    Sexual activity: None   Other Topics Concern    None   Social History Narrative    None     Social Determinants of Health     Financial Resource Strain:     Difficulty of Paying Living Expenses: Not on file   Food Insecurity:     Worried About Running Out of Food in the Last Year: Not on file    Teri of Food in the Last Year: Not on file   Transportation Needs:     Lack of Transportation (Medical): Not on file    Lack of Transportation (Non-Medical):  Not on file   Physical Activity:     Days of Exercise per Week: Not on file    Minutes of Exercise per Session: Not on file   Stress:     Feeling of Stress : Not on file   Social Connections:     Frequency of Communication with Friends and Family: Not on file    Frequency of Social Gatherings with Friends and Family: Not on file    Attends Voodoo Services: Not on file    Active Member of Clubs or Organizations: Not on file    Attends Club or Organization Meetings: Not on file    Marital Status: Not on file   Intimate Partner Violence:     Fear of Current or Ex-Partner: Not on file    Emotionally Abused: Not on file    Physically Abused: Not on file   Long Sexually Abused: Not on file   Housing Stability:     Unable to Pay for Housing in the Last Year: Not on file    Number of [place] in the Last Year: Not on file    Unstable Housing in the Last Year: Not on file       SCREENINGS         Aylett Coma Scale  Eye Opening: Spontaneous  Best Verbal Response: Oriented  Best Motor Response: Obeys commands  Niraj Coma Scale Score: 15                     CIWA Assessment  BP: (!) 159/76  Pulse: 112                 PHYSICAL EXAM    (up to 7 for level 4, 8 or more for level 5)     ED Triage Vitals [03/05/22 0510]   BP Temp Temp Source Pulse Resp SpO2 Height Weight   120/85 99.1 °F (37.3 °C) Tympanic 134 18 99 % -- --       Physical Exam  Constitutional:       General: He is not in acute distress. Appearance: He is well-developed. He is not diaphoretic. HENT:      Head: Normocephalic and atraumatic. Eyes:      General:         Right eye: No discharge. Left eye: No discharge. Neck:      Trachea: No tracheal deviation. Cardiovascular:      Rate and Rhythm: Normal rate and regular rhythm. Heart sounds: No murmur heard. No friction rub. Pulmonary:      Effort: Pulmonary effort is normal. No respiratory distress. Breath sounds: No stridor. No wheezing or rales. Chest:      Chest wall: No tenderness. Abdominal:      General: There is no distension. Palpations: Abdomen is soft. There is no mass. Tenderness: There is no abdominal tenderness. There is no guarding or rebound. Musculoskeletal:         General: No tenderness or deformity. Normal range of motion. Cervical back: Normal range of motion. Skin:     General: Skin is warm. Findings: No erythema or rash. Neurological:      Mental Status: He is alert and oriented to person, place, and time.    Psychiatric:         Behavior: Behavior normal.         DIAGNOSTIC RESULTS     EKG: All EKG's are interpreted by the Emergency Department Physician who either signs or Co-signs this chart in the absence of a cardiologist.        RADIOLOGY:   Non-plain film images such as CT, Ultrasound and MRI are read by the radiologist. Plain radiographic images are visualized and preliminarily interpreted by the emergency physician with the below findings:      Interpretation per the Radiologist below, if available at the time of this note:    No orders to display         ED BEDSIDE ULTRASOUND:   Performed by ED Physician - none    LABS:  Labs Reviewed   BASIC METABOLIC PANEL W/ REFLEX TO MG FOR LOW K - Abnormal; Notable for the following components:       Result Value    Glucose 213 (*)     Bun/Cre Ratio 22 (*)     All other components within normal limits       All other labs were within normal range or not returned as of this dictation. EMERGENCY DEPARTMENT COURSE and DIFFERENTIAL DIAGNOSIS/MDM:   Vitals:    Vitals:    03/05/22 0510 03/05/22 0525 03/05/22 0555 03/05/22 0630   BP: 120/85 134/87 138/79 (!) 159/76   Pulse: 134   112   Resp: 18   16   Temp: 99.1 °F (37.3 °C)      TempSrc: Tympanic      SpO2: 99% 93% 94% 95%           MDM  Number of Diagnoses or Management Options  Nausea vomiting and diarrhea  Diagnosis management comments: 60-year-old male presented with concern for nausea vomiting diarrhea after having some potential bad cheese. Exam was unremarkable patient was tachycardic but not hypotensive.  2 L of lactic ringer were ordered antiemetic medication and electrolytes were ordered. Reassessment the patient demonstrated his tachycardia improved and feeling better. Patient was also provided Toradol due to back pain he was having that he stated was from throwing up so much. Patient with prior prescription for antiemetic medication counseled on clear liquid diet advancing as tolerated. Time discharge patient was tolerating p.o. REASSESSMENT          CRITICAL CARE TIME   Total Critical Care time was  minutes, excluding separately reportable procedures.   There was a high probability of clinically significant/life threatening deterioration in the patient's condition which required my urgent intervention. CONSULTS:  None    PROCEDURES:  Unless otherwise noted below, none     Procedures        FINAL IMPRESSION      1. Nausea vomiting and diarrhea          DISPOSITION/PLAN   DISPOSITION        PATIENT REFERRED TO:  No follow-up provider specified. DISCHARGE MEDICATIONS:  New Prescriptions    ONDANSETRON (ZOFRAN ODT) 4 MG DISINTEGRATING TABLET    Take 1 tablet by mouth every 8 hours as needed for Nausea or Vomiting     Controlled Substances Monitoring:     No flowsheet data found.     (Please note that portions of this note were completed with a voice recognition program.  Efforts were made to edit the dictations but occasionally words are mis-transcribed.)    Emelina Montana MD (electronically signed)  Attending Emergency Physician            Emelina Montana MD  03/05/22 [id number]

## 2022-03-23 LAB
ALBUMIN SERPL-MCNC: 4.3 G/DL
ALP BLD-CCNC: 52 U/L
ALT SERPL-CCNC: 38 U/L
ANION GAP SERPL CALCULATED.3IONS-SCNC: ABNORMAL MMOL/L
AST SERPL-CCNC: 22 U/L
BILIRUB SERPL-MCNC: 0.6 MG/DL (ref 0.1–1.4)
BUN BLDV-MCNC: ABNORMAL MG/DL
CALCIUM SERPL-MCNC: 9.2 MG/DL
CHLORIDE BLD-SCNC: 103 MMOL/L
CO2: 33 MMOL/L
CREAT SERPL-MCNC: 0.9 MG/DL
GFR CALCULATED: ABNORMAL
GLUCOSE BLD-MCNC: 96 MG/DL
POTASSIUM SERPL-SCNC: 4.6 MMOL/L
PROSTATE SPECIFIC ANTIGEN: 2.78 NG/ML
SODIUM BLD-SCNC: 142 MMOL/L
TOTAL PROTEIN: 6.9

## 2022-03-31 ENCOUNTER — OFFICE VISIT (OUTPATIENT)
Dept: UROLOGY | Age: 67
End: 2022-03-31
Payer: MEDICARE

## 2022-03-31 VITALS
RESPIRATION RATE: 16 BRPM | HEIGHT: 69 IN | HEART RATE: 98 BPM | WEIGHT: 227 LBS | DIASTOLIC BLOOD PRESSURE: 89 MMHG | TEMPERATURE: 97.8 F | SYSTOLIC BLOOD PRESSURE: 136 MMHG | BODY MASS INDEX: 33.62 KG/M2

## 2022-03-31 DIAGNOSIS — N40.1 BENIGN NON-NODULAR PROSTATIC HYPERPLASIA WITH LOWER URINARY TRACT SYMPTOMS: Primary | ICD-10-CM

## 2022-03-31 DIAGNOSIS — Z12.5 SCREENING PSA (PROSTATE SPECIFIC ANTIGEN): ICD-10-CM

## 2022-03-31 DIAGNOSIS — N52.9 ERECTILE DYSFUNCTION, UNSPECIFIED ERECTILE DYSFUNCTION TYPE: ICD-10-CM

## 2022-03-31 PROCEDURE — 3017F COLORECTAL CA SCREEN DOC REV: CPT | Performed by: NURSE PRACTITIONER

## 2022-03-31 PROCEDURE — 1123F ACP DISCUSS/DSCN MKR DOCD: CPT | Performed by: NURSE PRACTITIONER

## 2022-03-31 PROCEDURE — 4040F PNEUMOC VAC/ADMIN/RCVD: CPT | Performed by: NURSE PRACTITIONER

## 2022-03-31 PROCEDURE — PBSHW PR MEASUREMENT,POST-VOID RESIDUAL VOLUME BY US,NON-IMAGING: Performed by: NURSE PRACTITIONER

## 2022-03-31 PROCEDURE — 51798 US URINE CAPACITY MEASURE: CPT | Performed by: NURSE PRACTITIONER

## 2022-03-31 PROCEDURE — 1036F TOBACCO NON-USER: CPT | Performed by: NURSE PRACTITIONER

## 2022-03-31 PROCEDURE — G8484 FLU IMMUNIZE NO ADMIN: HCPCS | Performed by: NURSE PRACTITIONER

## 2022-03-31 PROCEDURE — G8427 DOCREV CUR MEDS BY ELIG CLIN: HCPCS | Performed by: NURSE PRACTITIONER

## 2022-03-31 PROCEDURE — G8417 CALC BMI ABV UP PARAM F/U: HCPCS | Performed by: NURSE PRACTITIONER

## 2022-03-31 PROCEDURE — 99214 OFFICE O/P EST MOD 30 MIN: CPT | Performed by: NURSE PRACTITIONER

## 2022-03-31 ASSESSMENT — ENCOUNTER SYMPTOMS
CONSTIPATION: 0
COLOR CHANGE: 0
ABDOMINAL PAIN: 0
COUGH: 0
VOMITING: 0
WHEEZING: 0
SHORTNESS OF BREATH: 0
EYE REDNESS: 0
NAUSEA: 0
BACK PAIN: 0

## 2022-03-31 NOTE — PATIENT INSTRUCTIONS
SURVEY:    You may be receiving a survey from [employer] regarding your visit today. Please complete the survey to enable us to provide the highest quality of care to you and your family. If you cannot score us a very good on any question, please call the office to discuss how we could have made your experience a very good one. Thank you.

## 2022-03-31 NOTE — PROGRESS NOTES
HPI:          Patient is a 78 y.o. male in no acute distress. He is alert and oriented to person, place, and time. History  LUTS: nocturia, sensation of incomplete emptying, urgency, weak stream, post void dribbling. These are new in the last year or so and are slowly worsening. Has not tried meds for this in past.    Daily cialis     ED: able to achieve erection but unable to maintain through climax. Tried as needed Cialis in past, worked well but he had congestion so stopped it. On trimix, but stopped due to cost      PSA  3/2022 - 2.78  2/2021 - 1.60    Today  Here today to follow-up for BPH, erectile dysfunction, and prostate cancer screening. Most recent PSA is 2.78. He denies unintentional weight loss, decreased energy or appetite, new or worsening bone/hip/back pain. He denies any lower extremity numbness and tingling. He denies new or worsening LUTS. PVR is low, 24 mL. He denies frequency, urgency or nocturia. He denies gross hematuria or dysuria. He is interested in resuming trimix. He denies any chest pain or shortness of breath.       Past Medical History:   Diagnosis Date    Arthritis     BPH (benign prostatic hyperplasia)     Erectile dysfunction 1/28/2016    Herpes zoster without mention of complication     Hyperlipidemia     Hypertension      Past Surgical History:   Procedure Laterality Date    ANKLE FRACTURE SURGERY      ANKLE SURGERY      BACK SURGERY      BACK SURGERY      CHOLECYSTECTOMY      HERNIA REPAIR      KNEE SURGERY Left      Outpatient Encounter Medications as of 3/31/2022   Medication Sig Dispense Refill    NONFORMULARY Heal and soothe 8 daily      ondansetron (ZOFRAN ODT) 4 MG disintegrating tablet Take 1 tablet by mouth every 8 hours as needed for Nausea or Vomiting 7 tablet 0    meloxicam (MOBIC) 15 MG tablet Take 15 mg by mouth daily as needed       traMADol (ULTRAM) 50 MG tablet TAKE 1 TABLET BY MOUTH UP TO TWICE A DAY AS NEEDED **MUST LAST 30 pain, frequency, hematuria, penile discharge, penile pain, scrotal swelling, testicular pain and urgency. Musculoskeletal: Negative for back pain, joint swelling and myalgias. Skin: Negative for color change, rash and wound. Neurological: Negative for dizziness, tremors and numbness. Hematological: Negative for adenopathy. Does not bruise/bleed easily. BP (!) 149/94 (Site: Right Upper Arm, Position: Sitting, Cuff Size: Medium Adult)   Pulse 97   Temp 97.8 °F (36.6 °C) (Temporal)   Resp 16   Ht 5' 9\" (1.753 m)   Wt 227 lb (103 kg)   BMI 33.52 kg/m²       PHYSICAL EXAM:  Constitutional: Patient in no acute distress; Neuro: alert and oriented to person place and time. Psych: Mood and affect normal.  Skin: Normal  Lungs: Respiratory effort normal  Cardiovascular:  Normal peripheral pulses  Abdomen: Soft, non-tender, non-distended with no CVA, flank pain  Bladder non-tender and not distended. Lymphatics: no palpable lymphadenopathy  Penis normal  Urethral meatus normal  Scrotal exam normal  Testicles normal bilaterally  Epididymis normal bilaterally  No evidence of inguinal hernia  Anus and perineum normal  Normal rectal tone with no masses  Prostate soft, non-tender to palpation. No palpable nodules. Estimated 40 grams. Seminal vesicles not palpable. Lab Results   Component Value Date    BUN 20 03/05/2022     Lab Results   Component Value Date    CREATININE 0.90 03/14/2022     Lab Results   Component Value Date    PSA 2.78 03/14/2022    PSA 1.60 02/18/2021       ASSESSMENT:   Diagnosis Orders   1. Benign non-nodular prostatic hyperplasia with lower urinary tract symptoms  GA MEASUREMENT,POST-VOID RESIDUAL VOLUME BY US,NON-IMAGING   2. Erectile dysfunction, unspecified erectile dysfunction type     3. Screening PSA (prostate specific antigen)  PSA Screening         PLAN:  Resume trimix. Went over Trimix injection technique with the patient during today's office visit.      He was instructed as follows:  · wash hands   · clean rubber top of medication with alcohol wipe  · draw up medication (discussed dose titration from 0.1-0.5 mL as needed)  · clean injection location with alcohol wipe  · hold penis towards opposite leg  · inject into the base at the 10:00 or 2:00 position with needle at 90 degree angle avoiding any visible blood vessels  · switch sides each time. · if develops bruising/hematoma, avoid that side until healed. · hold pressure if has bleeding after injection. · go to ER for erection lasting >4 hrs. Pt will attempt this as instructed. He will call office if he has any difficulty or questions.      F/U in one year with a PSA prior    JORDEN due in two years

## 2023-03-31 ENCOUNTER — HOSPITAL ENCOUNTER (OUTPATIENT)
Age: 68
Discharge: HOME | End: 2023-03-31
Payer: MEDICARE

## 2023-03-31 DIAGNOSIS — Z12.5 SCREENING PSA (PROSTATE SPECIFIC ANTIGEN): ICD-10-CM

## 2023-03-31 LAB — PROSTATE SPECIFIC ANTIGEN: 2.11 NG/ML

## 2023-03-31 PROCEDURE — G0103 PSA SCREENING: HCPCS

## 2023-03-31 PROCEDURE — 36415 COLL VENOUS BLD VENIPUNCTURE: CPT

## 2023-04-03 ENCOUNTER — OFFICE VISIT (OUTPATIENT)
Dept: UROLOGY | Age: 68
End: 2023-04-03
Payer: MEDICARE

## 2023-04-03 VITALS
WEIGHT: 222 LBS | DIASTOLIC BLOOD PRESSURE: 84 MMHG | HEIGHT: 69 IN | HEART RATE: 81 BPM | BODY MASS INDEX: 32.88 KG/M2 | SYSTOLIC BLOOD PRESSURE: 149 MMHG | TEMPERATURE: 97.7 F

## 2023-04-03 DIAGNOSIS — N52.9 ERECTILE DYSFUNCTION, UNSPECIFIED ERECTILE DYSFUNCTION TYPE: ICD-10-CM

## 2023-04-03 DIAGNOSIS — Z12.5 SCREENING PSA (PROSTATE SPECIFIC ANTIGEN): ICD-10-CM

## 2023-04-03 DIAGNOSIS — N40.1 BENIGN NON-NODULAR PROSTATIC HYPERPLASIA WITH LOWER URINARY TRACT SYMPTOMS: Primary | ICD-10-CM

## 2023-04-03 PROCEDURE — 99213 OFFICE O/P EST LOW 20 MIN: CPT | Performed by: PHYSICIAN ASSISTANT

## 2023-04-03 PROCEDURE — PBSHW PR MEAS POST-VOIDING RESIDUAL URINE&/BLADDER CAP: Performed by: PHYSICIAN ASSISTANT

## 2023-04-03 PROCEDURE — 51798 US URINE CAPACITY MEASURE: CPT | Performed by: PHYSICIAN ASSISTANT

## 2023-04-03 PROCEDURE — G8427 DOCREV CUR MEDS BY ELIG CLIN: HCPCS | Performed by: PHYSICIAN ASSISTANT

## 2023-04-03 PROCEDURE — G8417 CALC BMI ABV UP PARAM F/U: HCPCS | Performed by: PHYSICIAN ASSISTANT

## 2023-04-03 PROCEDURE — 3017F COLORECTAL CA SCREEN DOC REV: CPT | Performed by: PHYSICIAN ASSISTANT

## 2023-04-03 PROCEDURE — 1123F ACP DISCUSS/DSCN MKR DOCD: CPT | Performed by: PHYSICIAN ASSISTANT

## 2023-04-03 PROCEDURE — 1036F TOBACCO NON-USER: CPT | Performed by: PHYSICIAN ASSISTANT

## 2023-04-03 NOTE — PROGRESS NOTES
Bladderscan performed in office today:  Pt voided - at home 1 hour ago, PVR - 36 mL
Value Date    PSA 2.11 03/31/2023    PSA 2.78 03/14/2022    PSA 1.60 02/18/2021       ASSESSMENT:   Diagnosis Orders   1. Benign non-nodular prostatic hyperplasia with lower urinary tract symptoms  CO JAZMINE POST-VOIDING RESIDUAL URINE&/BLADDER CAP      2. Erectile dysfunction, unspecified erectile dysfunction type        3.  Screening PSA (prostate specific antigen)  PSA Screening          PLAN:  Follow-up in 1 year with PSA and PVR    3/2024 JORDEN

## 2023-04-03 NOTE — PATIENT INSTRUCTIONS
SURVEY:    You may be receiving a survey from [employer] regarding your visit today. Please complete the survey to enable us to provide the highest quality of care to you and your family. If you cannot score us a very good on any question, please call the office to discuss how we could have made your experience a very good one. Thank you.

## 2024-03-26 ENCOUNTER — HOSPITAL ENCOUNTER (OUTPATIENT)
Age: 69
Discharge: HOME | End: 2024-03-26
Payer: MEDICARE

## 2024-03-26 DIAGNOSIS — Z12.5 SCREENING PSA (PROSTATE SPECIFIC ANTIGEN): ICD-10-CM

## 2024-03-26 LAB — PSA SERPL-MCNC: 1.7 NG/ML (ref 0–4)

## 2024-03-26 PROCEDURE — 36415 COLL VENOUS BLD VENIPUNCTURE: CPT

## 2024-03-26 PROCEDURE — G0103 PSA SCREENING: HCPCS

## 2024-04-17 ENCOUNTER — OFFICE VISIT (OUTPATIENT)
Dept: UROLOGY | Age: 69
End: 2024-04-17
Payer: MEDICARE

## 2024-04-17 VITALS
BODY MASS INDEX: 33.8 KG/M2 | DIASTOLIC BLOOD PRESSURE: 82 MMHG | WEIGHT: 228.2 LBS | TEMPERATURE: 97.7 F | HEART RATE: 87 BPM | SYSTOLIC BLOOD PRESSURE: 111 MMHG | HEIGHT: 69 IN

## 2024-04-17 DIAGNOSIS — Z12.5 SCREENING PSA (PROSTATE SPECIFIC ANTIGEN): ICD-10-CM

## 2024-04-17 DIAGNOSIS — N40.1 BENIGN NON-NODULAR PROSTATIC HYPERPLASIA WITH LOWER URINARY TRACT SYMPTOMS: Primary | ICD-10-CM

## 2024-04-17 PROCEDURE — 3017F COLORECTAL CA SCREEN DOC REV: CPT | Performed by: PHYSICIAN ASSISTANT

## 2024-04-17 PROCEDURE — G8427 DOCREV CUR MEDS BY ELIG CLIN: HCPCS | Performed by: PHYSICIAN ASSISTANT

## 2024-04-17 PROCEDURE — 1036F TOBACCO NON-USER: CPT | Performed by: PHYSICIAN ASSISTANT

## 2024-04-17 PROCEDURE — 99213 OFFICE O/P EST LOW 20 MIN: CPT | Performed by: PHYSICIAN ASSISTANT

## 2024-04-17 PROCEDURE — PBSHW PR MEAS POST-VOIDING RESIDUAL URINE&/BLADDER CAP: Performed by: PHYSICIAN ASSISTANT

## 2024-04-17 PROCEDURE — 51798 US URINE CAPACITY MEASURE: CPT | Performed by: PHYSICIAN ASSISTANT

## 2024-04-17 PROCEDURE — G8417 CALC BMI ABV UP PARAM F/U: HCPCS | Performed by: PHYSICIAN ASSISTANT

## 2024-04-17 PROCEDURE — 1123F ACP DISCUSS/DSCN MKR DOCD: CPT | Performed by: PHYSICIAN ASSISTANT

## 2024-04-17 RX ORDER — PRAVASTATIN SODIUM 40 MG
40 TABLET ORAL DAILY
COMMUNITY
Start: 2024-04-02

## 2024-04-17 RX ORDER — CARVEDILOL 3.12 MG/1
3.12 TABLET ORAL 2 TIMES DAILY WITH MEALS
COMMUNITY

## 2024-04-17 ASSESSMENT — ENCOUNTER SYMPTOMS
COLOR CHANGE: 0
ABDOMINAL PAIN: 0
COUGH: 0
BACK PAIN: 0
CONSTIPATION: 1
EYE REDNESS: 0
SHORTNESS OF BREATH: 0
WHEEZING: 0
VOMITING: 0
NAUSEA: 0

## 2024-04-17 NOTE — PATIENT INSTRUCTIONS
FOR CONSTIPATION    It is very important to have regular, soft, daily bowel movements, because it WILL improve your urinary symptoms.    Take Miralax (or generic equivalent) 17g once daily (one capful). Take every day, DO NOT skip days, as it must be taken daily in order to be effective. DO NOT take just \"as needed\". It is safe to take long term and is recommended for your symptoms.  If one dose daily causes loose stools/diarrhea, decrease to 1/2 capful or 1/4 capful. If you cannot tolerate this medication, please notify our office.     If one dose daily of Miralax is not sufficient to produce a soft, easy to pass daily stool, you may also add an over-the-counter stool softener capsule. For example: colace (docusate).     For Miralax to have maximal effectiveness, be sure to increase your water intake - aim for 80 oz daily unless you are on a fluid-restriction from another provider.     SURVEY:    You may be receiving a survey from [employer] regarding your visit today.    Please complete the survey to enable us to provide the highest quality of care to you and your family.    If you cannot score us a very good on any question, please call the office to discuss how we could have made your experience a very good one.    Thank you.

## 2024-04-17 NOTE — PROGRESS NOTES
Bladderscan performed in office today:  Patient voided - 100 mL, PVR - 53 mL      
Patient voided - 100 mL, PVR - 53 mL    Past Medical History:   Diagnosis Date    Arthritis     BPH (benign prostatic hyperplasia)     Erectile dysfunction 1/28/2016    Herpes zoster without mention of complication     Hyperlipidemia     Hypertension      Past Surgical History:   Procedure Laterality Date    ANKLE FRACTURE SURGERY      ANKLE SURGERY      BACK SURGERY      BACK SURGERY      CHOLECYSTECTOMY      HERNIA REPAIR      KNEE SURGERY Left      Outpatient Encounter Medications as of 4/17/2024   Medication Sig Dispense Refill    pravastatin (PRAVACHOL) 40 MG tablet Take 1 tablet by mouth daily      carvedilol (COREG) 3.125 MG tablet Take 1 tablet by mouth 2 times daily (with meals)      NONFORMULARY daily IMMUNO 150 70 plant minerals plus 80 additional nutrients (5 tablets daily)      NONFORMULARY Heal and soothe 8 daily      ondansetron (ZOFRAN ODT) 4 MG disintegrating tablet Take 1 tablet by mouth every 8 hours as needed for Nausea or Vomiting 7 tablet 0    meloxicam (MOBIC) 15 MG tablet Take 1 tablet by mouth daily as needed      traMADol (ULTRAM) 50 MG tablet TAKE 1 TABLET BY MOUTH UP TO TWICE A DAY AS NEEDED **MUST LAST 30 DAYS**      fluticasone (FLONASE) 50 MCG/ACT nasal spray 1 spray by Nasal route 3 times daily As needed      [DISCONTINUED] Multiple Vitamin (MULTI VITAMIN MENS PO) Take by mouth daily  (Patient not taking: Reported on 4/17/2024)       No facility-administered encounter medications on file as of 4/17/2024.      Current Outpatient Medications on File Prior to Visit   Medication Sig Dispense Refill    pravastatin (PRAVACHOL) 40 MG tablet Take 1 tablet by mouth daily      carvedilol (COREG) 3.125 MG tablet Take 1 tablet by mouth 2 times daily (with meals)      NONFORMULARY daily IMMUNO 150 70 plant minerals plus 80 additional nutrients (5 tablets daily)      NONFORMULARY Heal and soothe 8 daily      ondansetron (ZOFRAN ODT) 4 MG disintegrating tablet Take 1 tablet by mouth every 8 hours as

## 2024-06-24 ENCOUNTER — HOSPITAL ENCOUNTER (OUTPATIENT)
Dept: HOSPITAL 100 - BFHLAB | Age: 69
Discharge: HOME | End: 2024-06-24
Payer: MEDICARE

## 2024-06-24 DIAGNOSIS — Z12.5: ICD-10-CM

## 2024-06-24 DIAGNOSIS — I10: Primary | ICD-10-CM

## 2024-06-24 DIAGNOSIS — E78.5: ICD-10-CM

## 2024-06-24 LAB
ALANINE AMINOTRANSFER ALT/SGPT: 53 U/L (ref 16–61)
ALBUMIN SERPL-MCNC: 3.9 G/DL (ref 3.2–5)
ALKALINE PHOSPHATASE: 59 U/L (ref 45–117)
ANION GAP: 4 (ref 5–15)
AST(SGOT): 29 U/L (ref 15–37)
BUN SERPL-MCNC: 12 MG/DL (ref 7–18)
BUN/CREAT RATIO: 13.1 RATIO (ref 10–20)
CALCIUM SERPL-MCNC: 9 MG/DL (ref 8.5–10.1)
CARBON DIOXIDE: 31 MMOL/L (ref 21–32)
CHLORIDE: 103 MMOL/L (ref 98–107)
CHOLEST SERPL-MCNC: 207 MG/DL
DEPRECATED RDW RBC: 48.9 FL (ref 35.1–43.9)
ERYTHROCYTE [DISTWIDTH] IN BLOOD: 13.7 % (ref 11.6–14.6)
EST GLOM FILT RATE - AFR AMER: 106 ML/MIN (ref 60–?)
GLOBULIN: 3.5 G/DL (ref 2.2–4.2)
GLUCOSE: 108 MG/DL (ref 74–106)
HCT VFR BLD AUTO: 52.9 % (ref 40–54)
HEMOGLOBIN: 17.3 G/DL (ref 13–16.5)
HGB BLD-MCNC: 17.3 G/DL (ref 13–16.5)
IMMATURE GRANULOCYTES COUNT: 0.01 X10^3/UL (ref 0–0)
MCV RBC: 95.7 FL (ref 80–94)
MEAN CORP HGB CONC: 32.7 G/DL (ref 32–36)
MEAN PLATELET VOL.: 10.8 FL (ref 6.2–12)
NRBC FLAGGED BY ANALYZER: 0 % (ref 0–5)
PLATELET # BLD: 250 K/MM3 (ref 150–450)
PLATELET COUNT: 250 K/MM3 (ref 150–450)
POTASSIUM: 4.7 MMOL/L (ref 3.5–5.1)
PSA,TOTAL - ANNUAL SCREEN: 2.3 NG/ML (ref 0–4)
RBC # BLD AUTO: 5.53 M/MM3 (ref 4.6–6.2)
RBC DISTRIBUTION WIDTH CV: 13.7 % (ref 11.6–14.6)
RBC DISTRIBUTION WIDTH SD: 48.9 FL (ref 35.1–43.9)
TRIGLYCERIDES: 167 MG/DL
VLDLC SERPL-MCNC: 33 MG/DL (ref 5–40)
WBC # BLD AUTO: 4.8 K/MM3 (ref 4.4–11)
WHITE BLOOD COUNT: 4.8 K/MM3 (ref 4.4–11)

## 2024-06-24 PROCEDURE — 36415 COLL VENOUS BLD VENIPUNCTURE: CPT

## 2024-06-24 PROCEDURE — 84153 ASSAY OF PSA TOTAL: CPT

## 2024-06-24 PROCEDURE — 80053 COMPREHEN METABOLIC PANEL: CPT

## 2024-06-24 PROCEDURE — G0103 PSA SCREENING: HCPCS

## 2024-06-24 PROCEDURE — 80061 LIPID PANEL: CPT

## 2024-06-24 PROCEDURE — 85025 COMPLETE CBC W/AUTO DIFF WBC: CPT

## 2024-10-03 ENCOUNTER — HOSPITAL ENCOUNTER (OUTPATIENT)
Age: 69
Discharge: HOME | End: 2024-10-03
Payer: MEDICARE

## 2024-10-03 DIAGNOSIS — R19.7: Primary | ICD-10-CM

## 2024-10-03 LAB
DEPRECATED RDW RBC: 49.4 FL (ref 35.1–43.9)
ERYTHROCYTE [DISTWIDTH] IN BLOOD: 13.8 % (ref 11.6–14.6)
HCT VFR BLD AUTO: 51.6 % (ref 40–54)
HEMOGLOBIN: 16.8 G/DL (ref 13–16.5)
HGB BLD-MCNC: 16.8 G/DL (ref 13–16.5)
IMMATURE GRANULOCYTES COUNT: 0.02 X10^3/UL (ref 0–0)
MCV RBC: 96.4 FL (ref 80–94)
MEAN CORP HGB CONC: 32.6 G/DL (ref 32–36)
MEAN PLATELET VOL.: 11.2 FL (ref 6.2–12)
NRBC FLAGGED BY ANALYZER: 0 % (ref 0–5)
PLATELET # BLD: 265 K/MM3 (ref 150–450)
PLATELET COUNT: 265 K/MM3 (ref 150–450)
RBC # BLD AUTO: 5.35 M/MM3 (ref 4.6–6.2)
RBC DISTRIBUTION WIDTH CV: 13.8 % (ref 11.6–14.6)
RBC DISTRIBUTION WIDTH SD: 49.4 FL (ref 35.1–43.9)
WBC # BLD AUTO: 6.2 K/MM3 (ref 4.4–11)
WHITE BLOOD COUNT: 6.2 K/MM3 (ref 4.4–11)

## 2024-10-03 PROCEDURE — 85025 COMPLETE CBC W/AUTO DIFF WBC: CPT

## 2024-10-03 PROCEDURE — 87177 OVA AND PARASITES SMEARS: CPT

## 2024-10-03 PROCEDURE — 87493 C DIFF AMPLIFIED PROBE: CPT

## 2024-10-03 PROCEDURE — 36415 COLL VENOUS BLD VENIPUNCTURE: CPT

## 2024-10-03 PROCEDURE — 87209 SMEAR COMPLEX STAIN: CPT

## 2024-10-03 PROCEDURE — 83630 LACTOFERRIN FECAL (QUAL): CPT

## 2025-02-20 ENCOUNTER — HOSPITAL ENCOUNTER (OUTPATIENT)
Dept: HOSPITAL 100 - BFHLAB | Age: 70
Discharge: HOME | End: 2025-02-20
Payer: MEDICARE

## 2025-02-20 DIAGNOSIS — E78.5: ICD-10-CM

## 2025-02-20 DIAGNOSIS — I10: Primary | ICD-10-CM

## 2025-02-20 DIAGNOSIS — E55.9: ICD-10-CM

## 2025-02-20 DIAGNOSIS — R07.9: ICD-10-CM

## 2025-02-20 LAB
ALANINE AMINOTRANSFER ALT/SGPT: 45 U/L (ref 16–61)
ALBUMIN SERPL-MCNC: 4 G/DL (ref 3.2–5)
ALKALINE PHOSPHATASE: 52 U/L (ref 45–117)
ANION GAP: 6 (ref 5–15)
AST(SGOT): 28 U/L (ref 15–37)
BUN SERPL-MCNC: 14 MG/DL (ref 7–18)
BUN/CREAT RATIO: 15.7 RATIO (ref 10–20)
CALCIUM SERPL-MCNC: 9.3 MG/DL (ref 8.5–10.1)
CARBON DIOXIDE: 30 MMOL/L (ref 21–32)
CHLORIDE: 102 MMOL/L (ref 98–107)
CHOLEST SERPL-MCNC: 213 MG/DL
CREAT SERPL-MCNC: 0.89 MG/DL (ref 0.7–1.3)
DEPRECATED RDW RBC: 47.7 FL (ref 35.1–43.9)
DIFFERENTIAL INDICATED: (no result)
ERYTHROCYTE [DISTWIDTH] IN BLOOD: 13.6 % (ref 11.6–14.6)
EST GLOM FILT RATE - AFR AMER: 109 ML/MIN (ref 60–?)
GLOBULIN: 3.7 G/DL (ref 2.2–4.2)
GLUCOSE: 78 MG/DL (ref 74–106)
HCT VFR BLD AUTO: 54.1 % (ref 40–54)
HEMOGLOBIN: 18 G/DL (ref 13–16.5)
HGB BLD-MCNC: 18 G/DL (ref 13–16.5)
IMMATURE GRANULOCYTES COUNT: 0.02 X10^3/UL (ref 0–0)
MCV RBC: 95.4 FL (ref 80–94)
MEAN CORP HGB CONC: 33.3 G/DL (ref 32–36)
MEAN PLATELET VOL.: 10.6 FL (ref 6.2–12)
NRBC FLAGGED BY ANALYZER: 0 % (ref 0–5)
PLATELET # BLD: 297 K/MM3 (ref 150–450)
PLATELET COUNT: 297 K/MM3 (ref 150–450)
POTASSIUM: 4 MMOL/L (ref 3.5–5.1)
RBC # BLD AUTO: 5.67 M/MM3 (ref 4.6–6.2)
RBC DISTRIBUTION WIDTH CV: 13.6 % (ref 11.6–14.6)
RBC DISTRIBUTION WIDTH SD: 47.7 FL (ref 35.1–43.9)
TRIGLYCERIDES: 138 MG/DL
VLDLC SERPL-MCNC: 28 MG/DL (ref 5–40)
WBC # BLD AUTO: 6.9 K/MM3 (ref 4.4–11)
WHITE BLOOD COUNT: 6.9 K/MM3 (ref 4.4–11)

## 2025-02-20 PROCEDURE — 80053 COMPREHEN METABOLIC PANEL: CPT

## 2025-02-20 PROCEDURE — 84439 ASSAY OF FREE THYROXINE: CPT

## 2025-02-20 PROCEDURE — 85025 COMPLETE CBC W/AUTO DIFF WBC: CPT

## 2025-02-20 PROCEDURE — 80061 LIPID PANEL: CPT

## 2025-02-20 PROCEDURE — 82728 ASSAY OF FERRITIN: CPT

## 2025-02-20 PROCEDURE — 83880 ASSAY OF NATRIURETIC PEPTIDE: CPT

## 2025-02-20 PROCEDURE — 84443 ASSAY THYROID STIM HORMONE: CPT

## 2025-02-20 PROCEDURE — 83735 ASSAY OF MAGNESIUM: CPT

## 2025-02-20 PROCEDURE — 82607 VITAMIN B-12: CPT

## 2025-02-20 PROCEDURE — 82306 VITAMIN D 25 HYDROXY: CPT

## 2025-02-20 PROCEDURE — 36415 COLL VENOUS BLD VENIPUNCTURE: CPT

## 2025-02-20 PROCEDURE — 83540 ASSAY OF IRON: CPT

## 2025-02-21 LAB
BNP,B-TYPE NATRIURETIC PEPTIDE: 4 PG/ML (ref 0–100)
FERRITIN SERPL-MCNC: 374 NG/ML (ref 26–388)
IRON SPEC-MCNT: 107 UG/DL (ref 65–175)
MAGNESIUM: 2.4 MG/DL (ref 1.6–2.6)
VITAMIN B12: 370 PG/ML (ref 211–911)
VITAMIN D,25 HYDROXY: 39.5 NG/ML

## 2025-05-13 ENCOUNTER — TELEPHONE (OUTPATIENT)
Dept: UROLOGY | Age: 70
End: 2025-05-13

## 2025-05-14 DIAGNOSIS — Z12.5 SCREENING PSA (PROSTATE SPECIFIC ANTIGEN): Primary | ICD-10-CM

## 2025-05-14 LAB — PROSTATE SPECIFIC ANTIGEN: 2.42 NG/ML

## 2025-05-19 ENCOUNTER — RESULTS FOLLOW-UP (OUTPATIENT)
Dept: UROLOGY | Age: 70
End: 2025-05-19

## 2025-05-19 ENCOUNTER — OFFICE VISIT (OUTPATIENT)
Dept: UROLOGY | Age: 70
End: 2025-05-19
Payer: MEDICARE

## 2025-05-19 VITALS — WEIGHT: 217 LBS | TEMPERATURE: 97.1 F | HEIGHT: 69 IN | BODY MASS INDEX: 32.14 KG/M2

## 2025-05-19 DIAGNOSIS — Z12.5 SCREENING PSA (PROSTATE SPECIFIC ANTIGEN): ICD-10-CM

## 2025-05-19 DIAGNOSIS — Z12.5 SCREENING PSA (PROSTATE SPECIFIC ANTIGEN): Primary | ICD-10-CM

## 2025-05-19 DIAGNOSIS — N40.1 BENIGN NON-NODULAR PROSTATIC HYPERPLASIA WITH LOWER URINARY TRACT SYMPTOMS: ICD-10-CM

## 2025-05-19 PROCEDURE — 51798 US URINE CAPACITY MEASURE: CPT | Performed by: UROLOGY

## 2025-05-19 PROCEDURE — 1159F MED LIST DOCD IN RCRD: CPT | Performed by: UROLOGY

## 2025-05-19 PROCEDURE — 1123F ACP DISCUSS/DSCN MKR DOCD: CPT | Performed by: UROLOGY

## 2025-05-19 PROCEDURE — G8417 CALC BMI ABV UP PARAM F/U: HCPCS | Performed by: UROLOGY

## 2025-05-19 PROCEDURE — 3017F COLORECTAL CA SCREEN DOC REV: CPT | Performed by: UROLOGY

## 2025-05-19 PROCEDURE — G8427 DOCREV CUR MEDS BY ELIG CLIN: HCPCS | Performed by: UROLOGY

## 2025-05-19 PROCEDURE — PBSHW PBB SHADOW CHARGE: Performed by: UROLOGY

## 2025-05-19 PROCEDURE — 1036F TOBACCO NON-USER: CPT | Performed by: UROLOGY

## 2025-05-19 PROCEDURE — 99214 OFFICE O/P EST MOD 30 MIN: CPT | Performed by: UROLOGY

## 2025-05-19 RX ORDER — PREDNISONE 20 MG/1
10 TABLET ORAL
COMMUNITY
Start: 2025-05-05

## 2025-05-19 RX ORDER — EPINEPHRINE 0.3 MG/.3ML
INJECTION SUBCUTANEOUS ONCE
COMMUNITY
Start: 2025-05-05

## 2025-05-19 RX ORDER — CLOPIDOGREL BISULFATE 75 MG/1
75 TABLET ORAL DAILY
COMMUNITY
Start: 2025-05-05

## 2025-05-19 RX ORDER — AMLODIPINE BESYLATE 5 MG/1
5 TABLET ORAL DAILY
COMMUNITY

## 2025-05-19 RX ORDER — ALBUTEROL SULFATE 90 UG/1
1 INHALANT RESPIRATORY (INHALATION) EVERY 4 HOURS PRN
COMMUNITY
Start: 2025-02-21

## 2025-05-19 NOTE — PROGRESS NOTES
Bladderscan performed in office today:  Pt voided - 200 mL, PVR - 165 mL   
symptoms             PLAN:  Patient will follow-up with us in 1 year with a repeat PSA.  He will call us sooner if he would like any help with erections.